# Patient Record
Sex: FEMALE | Race: WHITE | NOT HISPANIC OR LATINO | URBAN - METROPOLITAN AREA
[De-identification: names, ages, dates, MRNs, and addresses within clinical notes are randomized per-mention and may not be internally consistent; named-entity substitution may affect disease eponyms.]

---

## 2017-11-20 ENCOUNTER — COMMUNICATION - HEALTHEAST (OUTPATIENT)
Dept: TELEHEALTH | Facility: CLINIC | Age: 27
End: 2017-11-20

## 2017-11-20 ENCOUNTER — OFFICE VISIT - HEALTHEAST (OUTPATIENT)
Dept: FAMILY MEDICINE | Facility: CLINIC | Age: 27
End: 2017-11-20

## 2017-11-20 ENCOUNTER — COMMUNICATION - HEALTHEAST (OUTPATIENT)
Dept: INTERNAL MEDICINE | Facility: CLINIC | Age: 27
End: 2017-11-20

## 2017-11-20 DIAGNOSIS — Z30.09 BIRTH CONTROL COUNSELING: ICD-10-CM

## 2017-11-20 DIAGNOSIS — N94.6 DYSMENORRHEA: ICD-10-CM

## 2017-11-20 ASSESSMENT — MIFFLIN-ST. JEOR: SCORE: 1456.29

## 2019-01-10 ENCOUNTER — COMMUNICATION - HEALTHEAST (OUTPATIENT)
Dept: FAMILY MEDICINE | Facility: CLINIC | Age: 29
End: 2019-01-10

## 2019-01-10 DIAGNOSIS — Z30.09 BIRTH CONTROL COUNSELING: ICD-10-CM

## 2019-01-10 DIAGNOSIS — N94.6 DYSMENORRHEA: ICD-10-CM

## 2019-01-11 RX ORDER — DROSPIRENONE AND ETHINYL ESTRADIOL 0.03MG-3MG
1 KIT ORAL DAILY
Qty: 3 PACKAGE | Refills: 4 | Status: SHIPPED | OUTPATIENT
Start: 2019-01-11

## 2021-05-31 VITALS — HEIGHT: 67 IN | WEIGHT: 153.8 LBS | BODY MASS INDEX: 24.14 KG/M2

## 2021-06-14 NOTE — PROGRESS NOTES
"Assessment/Plan:        Elayne Christiansen is a 27 y.o. female here requesting a refill on her OCP.   Chart and exam reviewed and will have her do a urine pregnancy test to confirm I will then send her BCP to her requested pharmacy.     1. Birth control counseling  - drospirenone-ethinyl estradiol (LEIGH ANN) 3-0.03 mg per tablet; Take 1 tablet by mouth daily.  Dispense: 3 Package; Refill: 4  - Pregnancy (Beta-hCG, Qual), Urine    2. Dysmenorrhea  - drospirenone-ethinyl estradiol (LEIGH ANN) 3-0.03 mg per tablet; Take 1 tablet by mouth daily.  Dispense: 3 Package; Refill: 4         Subjective:    Patient ID:   Elayne Christiansen is a 27 y.o. female who lives around [facility], here requesting a refill on her birth control pill Leigh Ann mainly used to help with dysmenorrhea.  This hormonal contraception has been working well for her with any intolerance or side effects.  She has no other questions or concerns       allergies, current medications, past family history, past medical history, past social history, past surgical history and problem list.    Review of Systems  A complete 10 point review of systems was obtained and is negative other than what is stated in the HPI.           Objective:   /76 (Patient Site: Right Arm, Patient Position: Sitting, Cuff Size: Adult Regular)  Pulse 83  Temp 98  F (36.7  C) (Oral)   Ht 5' 6.75\" (1.695 m)  Wt 153 lb 12.8 oz (69.8 kg)  LMP 10/30/2017 (Exact Date)  SpO2 98%  BMI 24.27 kg/m2      Physical Exam  General Appearance:    Alert, cooperative, no distress,    Eyes:    PERRL, conjunctiva/corneas clear,    Throat:   Lips, mucosa, and tongue normal; teeth and gums normal   Neck:   Supple, symmetrical, trachea midline, no adenopathy;        thyroid:  No enlargement/tenderness/nodules;    Lungs:     Clear to auscultation bilaterally, respirations unlabored   Heart:    Regular rate and rhythm, S1 and S2 normal, no murmur, rub   or gallop   Abdomen:     Soft, non-tender, " normal bowel sounds, no rebound or guarding, no masses, no organomegaly   Extremities:   Extremities normal, atraumatic, no cyanosis or edema   Skin:   Skin color, texture, turgor normal, no rashes or lesions

## 2021-06-16 PROBLEM — Z30.09 BIRTH CONTROL COUNSELING: Status: ACTIVE | Noted: 2017-11-20

## 2021-06-23 NOTE — TELEPHONE ENCOUNTER
RN cannot approve Refill Request    RN can NOT refill this medication Protocol failed and NO refill given. Last office visit: Visit date not found Last Physical: Visit date not found Last MTM visit: Visit date not found Last visit same specialty: 11/20/2017 Renny Moya MD.  Next visit within 3 mo: Visit date not found  Next physical within 3 mo: Visit date not found      Christina Toro, [facility] Triage/Med Refill 1/10/2019    Requested Prescriptions   Pending Prescriptions Disp Refills     drospirenone-ethinyl estradiol (NATHANIEL) 3-0.03 mg per tablet 3 Package 4     Sig: Take 1 tablet by mouth daily.    Oral Contraceptives Protocol Failed - 1/10/2019  7:33 AM       Failed - Visit with PCP or prescribing provider visit in last 12 months     Last office visit with prescriber/PCP: Visit date not found OR same dept: Visit date not found OR same specialty: 11/20/2017 Renny Moya MD  Last physical: Visit date not found Last MTM visit: Visit date not found   Next visit within 3 mo: Visit date not found  Next physical within 3 mo: Visit date not found  Prescriber OR PCP: No Primary Care Provider  Last diagnosis associated with med order: 1. Birth control counseling  - drospirenone-ethinyl estradiol (NATHANIEL) 3-0.03 mg per tablet; Take 1 tablet by mouth daily.  Dispense: 3 Package; Refill: 4    2. Dysmenorrhea  - drospirenone-ethinyl estradiol (NATHANIEL) 3-0.03 mg per tablet; Take 1 tablet by mouth daily.  Dispense: 3 Package; Refill: 4    If protocol passes may refill for 12 months if within 3 months of last provider visit (or a total of 15 months).